# Patient Record
Sex: FEMALE | Race: WHITE | NOT HISPANIC OR LATINO | Employment: OTHER | URBAN - METROPOLITAN AREA
[De-identification: names, ages, dates, MRNs, and addresses within clinical notes are randomized per-mention and may not be internally consistent; named-entity substitution may affect disease eponyms.]

---

## 2023-03-17 LAB
ALANINE AMINOTRANSFERASE (SGPT) (U/L) IN SER/PLAS: 28 U/L (ref 7–45)
ALBUMIN (G/DL) IN SER/PLAS: 4.3 G/DL (ref 3.4–5)
ALKALINE PHOSPHATASE (U/L) IN SER/PLAS: 59 U/L (ref 33–136)
ASPARTATE AMINOTRANSFERASE (SGOT) (U/L) IN SER/PLAS: 21 U/L (ref 9–39)
BILIRUBIN DIRECT (MG/DL) IN SER/PLAS: 0.1 MG/DL (ref 0–0.3)
BILIRUBIN TOTAL (MG/DL) IN SER/PLAS: 0.5 MG/DL (ref 0–1.2)
PROTEIN TOTAL: 7 G/DL (ref 6.4–8.2)

## 2025-05-27 ENCOUNTER — HOSPITAL ENCOUNTER (EMERGENCY)
Facility: HOSPITAL | Age: 65
Discharge: HOME | End: 2025-05-27
Payer: COMMERCIAL

## 2025-05-27 ENCOUNTER — APPOINTMENT (OUTPATIENT)
Dept: RADIOLOGY | Facility: HOSPITAL | Age: 65
End: 2025-05-27
Payer: COMMERCIAL

## 2025-05-27 VITALS
DIASTOLIC BLOOD PRESSURE: 84 MMHG | BODY MASS INDEX: 26.52 KG/M2 | HEART RATE: 65 BPM | RESPIRATION RATE: 16 BRPM | TEMPERATURE: 97.2 F | SYSTOLIC BLOOD PRESSURE: 157 MMHG | OXYGEN SATURATION: 100 % | HEIGHT: 68 IN | WEIGHT: 175 LBS

## 2025-05-27 DIAGNOSIS — M54.41 ACUTE MIDLINE LOW BACK PAIN WITH RIGHT-SIDED SCIATICA: Primary | ICD-10-CM

## 2025-05-27 PROCEDURE — 73552 X-RAY EXAM OF FEMUR 2/>: CPT | Mod: RT

## 2025-05-27 PROCEDURE — 73552 X-RAY EXAM OF FEMUR 2/>: CPT | Mod: RIGHT SIDE | Performed by: STUDENT IN AN ORGANIZED HEALTH CARE EDUCATION/TRAINING PROGRAM

## 2025-05-27 PROCEDURE — 73590 X-RAY EXAM OF LOWER LEG: CPT | Mod: RT

## 2025-05-27 PROCEDURE — 73590 X-RAY EXAM OF LOWER LEG: CPT | Mod: RIGHT SIDE | Performed by: RADIOLOGY

## 2025-05-27 PROCEDURE — 93971 EXTREMITY STUDY: CPT

## 2025-05-27 PROCEDURE — 72131 CT LUMBAR SPINE W/O DYE: CPT | Performed by: STUDENT IN AN ORGANIZED HEALTH CARE EDUCATION/TRAINING PROGRAM

## 2025-05-27 PROCEDURE — 99285 EMERGENCY DEPT VISIT HI MDM: CPT | Mod: 25

## 2025-05-27 PROCEDURE — 93971 EXTREMITY STUDY: CPT | Performed by: RADIOLOGY

## 2025-05-27 PROCEDURE — 72131 CT LUMBAR SPINE W/O DYE: CPT

## 2025-05-27 ASSESSMENT — PAIN DESCRIPTION - DESCRIPTORS: DESCRIPTORS: ACHING

## 2025-05-27 ASSESSMENT — COLUMBIA-SUICIDE SEVERITY RATING SCALE - C-SSRS
2. HAVE YOU ACTUALLY HAD ANY THOUGHTS OF KILLING YOURSELF?: NO
1. IN THE PAST MONTH, HAVE YOU WISHED YOU WERE DEAD OR WISHED YOU COULD GO TO SLEEP AND NOT WAKE UP?: NO
6. HAVE YOU EVER DONE ANYTHING, STARTED TO DO ANYTHING, OR PREPARED TO DO ANYTHING TO END YOUR LIFE?: NO

## 2025-05-27 ASSESSMENT — PAIN SCALES - GENERAL: PAINLEVEL_OUTOF10: 5 - MODERATE PAIN

## 2025-05-27 ASSESSMENT — PAIN DESCRIPTION - LOCATION: LOCATION: LEG

## 2025-05-27 ASSESSMENT — PAIN DESCRIPTION - ORIENTATION: ORIENTATION: RIGHT

## 2025-05-27 ASSESSMENT — PAIN - FUNCTIONAL ASSESSMENT: PAIN_FUNCTIONAL_ASSESSMENT: 0-10

## 2025-05-27 NOTE — ED TRIAGE NOTES
Pt arrived to ED through triage for complaints of right leg pain. Pt states for a couple of weeks he has been having pain in her right leg, she states over the past few days she has been having an increase in pain in the back of her knee that radiates into her calf and up into her thigh. She endorses some swelling in the leg and states the pain has been waking her up and she has had some difficulty ambulating.

## 2025-05-27 NOTE — ED PROVIDER NOTES
HPI   Chief Complaint   Patient presents with    Leg Pain     Pt states for a couple of weeks he has been having pain in her right leg, she states over the past few days she has been having an increase in pain in the back of her knee that radiates into her calf and up into her thigh. She endorses some swelling in the leg and states the pain has been waking her up and she has had some difficulty ambulating.        Is a 65-year-old female coming in for low back pain and right lower extremity pain.  She states that she has an aching pain that is constant to the right lower extremity.  When she gets up and stands and ambulates she will have increased discomfort mainly to the knee.  She denies any fall or injury to the area.  She denies fevers or chills.  She states she is otherwise healthy with no major medical problems.  No weakness of the leg.      History provided by:  Patient          Patient History   Medical History[1]  Surgical History[2]  Family History[3]  Social History[4]    Physical Exam   ED Triage Vitals [05/27/25 1200]   Temperature Heart Rate Respirations BP   36.2 °C (97.2 °F) 77 16 (!) 175/92      Pulse Ox Temp src Heart Rate Source Patient Position   100 % -- -- --      BP Location FiO2 (%)     -- --       Physical Exam  Vitals and nursing note reviewed.   Constitutional:       General: She is not in acute distress.     Appearance: Normal appearance. She is not toxic-appearing.   HENT:      Head: Normocephalic and atraumatic.      Nose: Nose normal.      Mouth/Throat:      Mouth: Mucous membranes are moist.      Pharynx: Oropharynx is clear.   Eyes:      Extraocular Movements: Extraocular movements intact.      Conjunctiva/sclera: Conjunctivae normal.      Pupils: Pupils are equal, round, and reactive to light.   Cardiovascular:      Rate and Rhythm: Regular rhythm.      Pulses: Normal pulses.      Heart sounds: Normal heart sounds.   Pulmonary:      Effort: Pulmonary effort is normal. No respiratory  distress.      Breath sounds: Normal breath sounds.   Abdominal:      General: Abdomen is flat. Bowel sounds are normal.      Palpations: Abdomen is soft.      Tenderness: There is no abdominal tenderness.   Musculoskeletal:         General: No tenderness or deformity. Normal range of motion.      Cervical back: Normal range of motion and neck supple.      Right lower leg: No edema.      Comments: No decreased range of motion of the right lower extremity.   Skin:     General: Skin is warm and dry.      Coloration: Skin is not jaundiced or pale.      Findings: No bruising.   Neurological:      General: No focal deficit present.      Mental Status: She is alert and oriented to person, place, and time. Mental status is at baseline.   Psychiatric:         Mood and Affect: Mood normal.         Behavior: Behavior normal.           ED Course & MDM   Diagnoses as of 05/27/25 1330   Acute midline low back pain with right-sided sciatica                 No data recorded     Linda Coma Scale Score: 15 (05/27/25 1201 : Alfreda Sawyer RN)                           Medical Decision Making  Summary:  Medical Decision Making:   Patient presented as described in HPI. Patient case including ROS, PE, and treatment and plan discussed with ED attending if attached as cosigner. Results from labs and or imaging included below if completed. Danette Lind  is a 65 y.o. coming in for Patient presents with:  Leg Pain: Pt states for a couple of weeks he has been having pain in her right leg, she states over the past few days she has been having an increase in pain in the back of her knee that radiates into her calf and up into her thigh. She endorses some swelling in the leg and states the pain has been waking her up and she has had some difficulty ambulating.   .  Patient reports right lower extremity pain.  She states that prior to her legs starting to bother her she did have some back discomfort.  She denies any falls or trauma.  She  describes it as a soreness to the leg however worse after prolonged immobilization.  She has an appoint with her primary care next week.  She is still ambulatory however it increases her discomfort.  She was offered prednisone and ordered it however she would reported to the nurse that she does not want to take it.  She had CT imaging of the low back did show slight retrolisthesis.  No concerning abnormalities on exam for emergent cause of her symptoms.  She does not want to take the steroids therefore will be referred out to her PCP for further follow-up and care.  Patient is ambulatory out of the emergency room and agrees to treatment plan.      Disposition is completed with shared decision making with the patient or guardian present with the patient. They were advised to follow up with PCP or recommended provider in 2-3 days for another evaluation and exam. I advised the patient to return or go to closest emergency room immediately if symptoms change, get worse, or new symptoms develop prior to follow up. I explained the plan and treatment course. Patient/guardian is in agreement with plan, treatment course, and follow up and state that they will comply.    Labs Reviewed - No data to display   Lower extremity venous duplex right   Final Result    No sonographic evidence for deep vein thrombosis within the evaluated    veins of the right lower extremity.          MACRO:    None          Signed by: Neftaly Valdez 5/27/2025 1:13 PM    Dictation workstation:   [id number]     XR femur right 2+ views   Final Result    No acute fracture or malalignment.                Signed by: Mauri Honeycutt 5/27/2025 1:10 PM    Dictation workstation:   [id number]     XR tibia fibula right 2 views   Final Result    1. No fracture or dislocation.          MACRO:    None.          Signed by: Neftaly Valdez 5/27/2025 1:12 PM    Dictation workstation:   [id number]     CT lumbar spine wo IV contrast   Final Result    No acute fracture or  traumatic malalignment.    Retrolisthesis of L5 on S1 is similar to comparison.    No significant bony spinal canal or foraminal stenosis.          MACRO:    None          Signed by: Mauri Honeycutt 5/27/2025 12:51 PM    Dictation workstation:   [id number]                            Tests/Medications/Escalations of Care considered but not given: As in MDM    Patient care discussed with: N/A  Social Determinants affecting care: N/A    Final diagnosis and disposition as documented     Diagnoses as of 05/27/25 1330  Acute midline low back pain with right-sided sciatica       Shared decision making was completed and determined that patient will be discharged. I discussed the differential; results and discharge plan with the patient and/or family/friend/caregiver if present.  I emphasized the importance of follow-up with the physician I referred them to in the timeframe recommended.  I explained reasons for the patient to return to the Emergency Department. They agreed that if they feel their condition is worsening or if they have any other concern they should call 911 immediately for further assistance. I gave the patient an opportunity to ask all questions they had and answered all of them accordingly. They understand return precautions and discharge instructions. The patient and/or family/friend/caregiver expressed understanding verbally and that they would comply.     Disposition: Discharge      This note has been transcribed using voice recognition and may contain grammatical errors, misplaced words, incorrect words, incorrect phrases or other errors.         Procedure  Procedures       [1] History reviewed. No pertinent past medical history.  [2] History reviewed. No pertinent surgical history.  [3] No family history on file.  [4]   Social History  Tobacco Use    Smoking status: Never    Smokeless tobacco: Never   Substance Use Topics    Alcohol use: Not on file    Drug use: Not on file        Max Cordon  DANIEL  05/27/25 1411

## 2025-06-04 ENCOUNTER — HOSPITAL ENCOUNTER (OUTPATIENT)
Dept: RADIOLOGY | Facility: CLINIC | Age: 65
Discharge: HOME | End: 2025-06-04
Payer: COMMERCIAL

## 2025-06-04 ENCOUNTER — HOSPITAL ENCOUNTER (OUTPATIENT)
Dept: RADIOLOGY | Facility: HOSPITAL | Age: 65
Discharge: HOME | End: 2025-06-04
Payer: COMMERCIAL

## 2025-06-04 DIAGNOSIS — E78.5 HYPERLIPIDEMIA, UNSPECIFIED HYPERLIPIDEMIA TYPE: ICD-10-CM

## 2025-06-04 DIAGNOSIS — I10 HYPERTENSION, UNSPECIFIED TYPE: ICD-10-CM

## 2025-06-04 DIAGNOSIS — M25.561 PAIN IN RIGHT KNEE: ICD-10-CM

## 2025-06-04 DIAGNOSIS — M25.562 PAIN IN LEFT KNEE: ICD-10-CM

## 2025-06-04 PROCEDURE — 75571 CT HRT W/O DYE W/CA TEST: CPT

## 2025-06-04 PROCEDURE — 73562 X-RAY EXAM OF KNEE 3: CPT | Mod: 50

## 2025-07-07 ENCOUNTER — TELEPHONE (OUTPATIENT)
Dept: CARDIOLOGY | Facility: HOSPITAL | Age: 65
End: 2025-07-07
Payer: COMMERCIAL

## 2025-07-09 PROBLEM — R11.2 PONV (POSTOPERATIVE NAUSEA AND VOMITING): Status: ACTIVE | Noted: 2023-08-28

## 2025-07-09 PROBLEM — Z98.890 PONV (POSTOPERATIVE NAUSEA AND VOMITING): Status: ACTIVE | Noted: 2023-08-28

## 2025-07-09 PROBLEM — H90.3 SENSORINEURAL HEARING LOSS (SNHL) OF BOTH EARS: Status: ACTIVE | Noted: 2025-07-09

## 2025-07-09 PROBLEM — H93.13 TINNITUS OF BOTH EARS: Status: ACTIVE | Noted: 2025-07-09

## 2025-07-09 RX ORDER — SUMATRIPTAN SUCCINATE 50 MG/1
TABLET ORAL
COMMUNITY
Start: 2024-02-06

## 2025-07-09 RX ORDER — ESTRADIOL 0.05 MG/D
FILM, EXTENDED RELEASE TRANSDERMAL
COMMUNITY

## 2025-07-09 RX ORDER — PSYLLIUM HUSK 0.4 G
600 CAPSULE ORAL
COMMUNITY
End: 2025-07-10 | Stop reason: WASHOUT

## 2025-07-09 RX ORDER — MELOXICAM 7.5 MG/1
TABLET ORAL
COMMUNITY
Start: 2025-06-04

## 2025-07-09 RX ORDER — BUPROPION HYDROCHLORIDE 75 MG/1
75 TABLET ORAL 2 TIMES DAILY
COMMUNITY
End: 2025-07-10 | Stop reason: ALTCHOICE

## 2025-07-09 NOTE — PROGRESS NOTES
"[facility] [place]       Primary Care Physician: Lilia De Leon DO  Date of Visit: 07/10/2025  3:20 PM EDT     HPI / Summary:   Danette Lind is a 65 y.o. female who presents for evaluation of hyperlipidemia and an elevated CAC score (68. 2025).     Danette is overall active, enjoys hiking, golfing and camping. No cardiopulmonary symptoms.    Breakfast: cottage cheese, blueberries, a lot of eggs (3-4 times per week)  Lunch: salad, or soup and half sandwich  Dinner: fish 2 times per week, chicken a few times, beef a few times per week  Snacks: popcorn, jello  Desserts: sugar, ice cream    ROS: Relevant review of symptoms of negative unless discussed above.     Problems:   Problem List[1]    Medical History:   Medical History[2]    Surgical Hx:   Surgical History[3]     Family Hx:   Family History[4]   Grandmother  of a heart attack in late 70s  Father -  in a car accident  Mother - no heart problems  2 sisters - no heart issues  1 brother - no heart issue  2 daughters - healthy, live in [place]    Social Hx:  Fun: golf, hike, camp  Occupation/School:   EtOH/Smoking/Drugs: social smoker off and on for 20 years (none in 25 years), not much alcohol, no drugs    Medications  Current Outpatient Medications   Medication Instructions    estradiol (Vivelle-DOT) 0.05 mg/24 hr patch APPLY 1 PATCH AS DIRECTED TWO TIMES A WEEK.    meloxicam (Mobic) 7.5 mg tablet TAKE UP TO 2 TABLETS BY MOUTH DAILY AS NEEDED FOR PAIN    multivitamin with minerals iron-free (multivitamin-iron-minerals-folic acid) 1 tablet, Daily RT    SUMAtriptan (Imitrex) 50 mg tablet Take one tablet by mouth at onset of headache.  May repeat after 2 hours.       Allergies  Azithromycin    Exam:   Vitals: /77 (BP Location: Left arm, Patient Position: Sitting, BP Cuff Size: Large adult)   Pulse 78   Ht 1.727 m (5' 8\")   Wt 86.2 kg (190 lb 1 oz)   SpO2 95%   BMI 28.90 kg/m²   Wt Readings " "from Last 5 Encounters:   07/10/25 86.2 kg (190 lb 1 oz)   05/27/25 79.4 kg (175 lb)   05/30/23 83.5 kg (184 lb)     GEN: Pleasant, well-appearing, no acute distress.  HEENT: JVP not elevated  CHEST: Clear to auscultation, No wheeze, good air movement.  CV: normal rate, regular rhythm, no murmurs or rubs  ABD: Soft  EXT: Warm, well perfused, No LE edema.   NEURO: grossly non focal  SKIN: No obvious rashes     Labs:   Lipids  Lab Results   Component Value Date    CHOL 233 (H) 02/17/2023     Lab Results   Component Value Date    HDL 71.7 02/17/2023     No results found for: \"LDLCALC\"  Lab Results   Component Value Date    TRIG 115 02/17/2023     No components found for: \"CHOLHDL\"    Hemoglobin A1C  Lab Results   Component Value Date    HGBA1C 5.3 02/26/2024       BMP  Lab Results   Component Value Date    GLUCOSE 83 02/15/2023    CALCIUM 9.3 02/15/2023     02/15/2023    K 3.8 02/15/2023    CO2 29 02/15/2023     02/15/2023    BUN 15 02/15/2023    CREATININE 0.90 02/15/2023     2/26/2024 at [facility]: Total cholesterol 217, , HDL 75  [facility]:       Notable Studies: imaging personally reviewed and summarized by me below  EKG:  -7/10/2025: NSR, HR 73, normal axis, normal intervals, no ST changes    Echo:  - 3/1/2023: LVEF 55 to 60%, normal RV size and function, no significant valvular disease    Cardiac CT:  - 6/4/2025: Total CAC 68, all in LAD    Assessment and Plan  Danette Lind is a 65 y.o. female who presents for evaluation of hyperlipidemia and an elevated CAC score (68. 6/2025).     Despite an elevated CAC score, she is asymptomatic from a cardiac perspective. We discussed that her lipid panel is abnormal (2/26/2024 at [facility]: Total cholesterol 217, , HDL 75) and her CAC of 68 is at the 79th percentile for women her age. Her ASCVD risk score is 4.7%. She would prefer non medication based therapy first. We discussed dietary changes, including less egg consumption (or using egg whites), consuming less " chicken and red meat and increasing the proportion of her diet that is plant based.     We will follow up in 1 year to review her lipids, symptoms and any concerning cardiac findings. At that time, we may determine whether lipid lowering therapy is needed.     Inder Soria MD  Director, Sports Cardiology  [facility]    Part of this note was completed using dictation and voice recognition software. Please excuse minor errors and typos.      Time Spent: I spent 40 minutes reviewing medical testing, obtaining medical history and counselling and educating on diagnosis and documenting clinical encounter.          [1]   Patient Active Problem List  Diagnosis    Adjustment disorder with mixed anxiety and depressed mood    Diverticula of colon    PONV (postoperative nausea and vomiting)    Sensorineural hearing loss (SNHL) of both ears    Symptomatic menopausal or female climacteric states    Thyroid nodule    Tinnitus of both ears   [2] No past medical history on file.  [3] No past surgical history on file.  [4] No family history on file.

## 2025-07-10 ENCOUNTER — APPOINTMENT (OUTPATIENT)
Dept: CARDIOLOGY | Facility: CLINIC | Age: 65
End: 2025-07-10
Payer: COMMERCIAL

## 2025-07-10 VITALS
WEIGHT: 190.06 LBS | HEART RATE: 78 BPM | SYSTOLIC BLOOD PRESSURE: 126 MMHG | OXYGEN SATURATION: 95 % | BODY MASS INDEX: 28.8 KG/M2 | HEIGHT: 68 IN | DIASTOLIC BLOOD PRESSURE: 77 MMHG

## 2025-07-10 DIAGNOSIS — I10 ESSENTIAL (PRIMARY) HYPERTENSION: Primary | ICD-10-CM

## 2025-07-10 DIAGNOSIS — E78.5 HYPERLIPIDEMIA, UNSPECIFIED HYPERLIPIDEMIA TYPE: ICD-10-CM

## 2025-07-10 PROCEDURE — 1036F TOBACCO NON-USER: CPT | Performed by: INTERNAL MEDICINE

## 2025-07-10 PROCEDURE — 93005 ELECTROCARDIOGRAM TRACING: CPT | Performed by: INTERNAL MEDICINE

## 2025-07-10 PROCEDURE — 3078F DIAST BP <80 MM HG: CPT | Performed by: INTERNAL MEDICINE

## 2025-07-10 PROCEDURE — 3074F SYST BP LT 130 MM HG: CPT | Performed by: INTERNAL MEDICINE

## 2025-07-10 PROCEDURE — 99202 OFFICE O/P NEW SF 15 MIN: CPT

## 2025-07-10 PROCEDURE — 99204 OFFICE O/P NEW MOD 45 MIN: CPT | Performed by: INTERNAL MEDICINE

## 2025-07-10 ASSESSMENT — PATIENT HEALTH QUESTIONNAIRE - PHQ9
SUM OF ALL RESPONSES TO PHQ9 QUESTIONS 1 AND 2: 0
2. FEELING DOWN, DEPRESSED OR HOPELESS: NOT AT ALL
1. LITTLE INTEREST OR PLEASURE IN DOING THINGS: NOT AT ALL

## 2025-07-10 ASSESSMENT — ENCOUNTER SYMPTOMS
OCCASIONAL FEELINGS OF UNSTEADINESS: 0
LOSS OF SENSATION IN FEET: 0
DEPRESSION: 0

## 2025-07-10 ASSESSMENT — COLUMBIA-SUICIDE SEVERITY RATING SCALE - C-SSRS
1. IN THE PAST MONTH, HAVE YOU WISHED YOU WERE DEAD OR WISHED YOU COULD GO TO SLEEP AND NOT WAKE UP?: NO
6. HAVE YOU EVER DONE ANYTHING, STARTED TO DO ANYTHING, OR PREPARED TO DO ANYTHING TO END YOUR LIFE?: NO
2. HAVE YOU ACTUALLY HAD ANY THOUGHTS OF KILLING YOURSELF?: NO

## 2025-07-10 ASSESSMENT — PAIN SCALES - GENERAL: PAINLEVEL_OUTOF10: 0-NO PAIN

## 2025-07-10 NOTE — PATIENT INSTRUCTIONS
Cut down eggs (or go to egg whites)  Cut down red meat and chicken significantly  More salad/plant based  More fish and tofu  Visit in one year, please bring cholesterol results

## 2025-07-19 LAB
ATRIAL RATE: 73 BPM
P AXIS: 60 DEGREES
P OFFSET: 197 MS
P ONSET: 140 MS
PR INTERVAL: 154 MS
Q ONSET: 217 MS
QRS COUNT: 12 BEATS
QRS DURATION: 88 MS
QT INTERVAL: 414 MS
QTC CALCULATION(BAZETT): 456 MS
QTC FREDERICIA: 442 MS
R AXIS: 70 DEGREES
T AXIS: 40 DEGREES
T OFFSET: 424 MS
VENTRICULAR RATE: 73 BPM